# Patient Record
Sex: MALE
[De-identification: names, ages, dates, MRNs, and addresses within clinical notes are randomized per-mention and may not be internally consistent; named-entity substitution may affect disease eponyms.]

---

## 2018-05-29 ENCOUNTER — HOSPITAL ENCOUNTER (EMERGENCY)
Dept: HOSPITAL 5 - ED | Age: 18
Discharge: LEFT BEFORE DISCHARGE | End: 2018-05-29
Payer: SELF-PAY

## 2018-05-29 VITALS — SYSTOLIC BLOOD PRESSURE: 134 MMHG | DIASTOLIC BLOOD PRESSURE: 52 MMHG

## 2018-05-29 DIAGNOSIS — Z11.3: Primary | ICD-10-CM

## 2018-05-29 PROCEDURE — 99281 EMR DPT VST MAYX REQ PHY/QHP: CPT

## 2018-05-29 NOTE — EMERGENCY DEPARTMENT REPORT
Chief Complaint: Urogenital-Male


Stated Complaint: MEDICAL CLEARENCE/STD


Time Seen by Provider: 05/29/18 11:01





- HPI


History of Present Illness: 





This is a 17-year-old male nontoxic, well nourished in appearance, no acute 

signs of distress presents to the ED for a STD check up.  Patient states his 

girlfriend has been diagnosed with chlamydia and he wants to be tested.  

Patient currently denies any symptoms and stated he is asymptomatic.  Patient 

denies any penile discharge.  Patient denies any testicular pain, penile ulcers

, or lesions.  Patient denies any urinary symptoms.  Patient denies any fever, 

chills, nausea, vomiting, chest pain, short of breath, abdominal pain, back pain

, stiff neck or headache.  Patient denies any allergies or past medical history.





- Exam


Vital Signs: 


 Vital Signs











  05/29/18





  10:26


 


Temperature 97.4 F L


 


Pulse Rate 58 L


 


Respiratory 16





Rate 


 


Blood Pressure 134/52


 


O2 Sat by Pulse 100





Oximetry 











Physical Exam: 








GENERAL: The patient is a well-developed, well-nourished in no apparent 

distress. Patient is alert and acting appropriately for age.  Alert and 

oriented 3, no apparent distress, normal gait, atraumatic.





LUNGS: Clear to auscultation. Non labor breathing. No intercostal retractions.  

Symmetrical with respiration, no wheezing, no rales, or crackles.


 


HEART: Regular rate and rhythm without murmur, rubs or gallops. No 

reproducible.  S1, S2 present, regular rate and rhythm without murmur, no rubs, 

no gallops.


 


ABDOMEN: Soft, nontender, and nondistended.  Positive bowel sounds.  No 

hepatosplenomegaly was noted. No guarding or rebound tenderness, negative 

epigastric bruit. Negative psoas sign, negative jolly sign, negative McBurneys 

sign


 


MSE screening note: 


Focused history and physical exam performed.


Due to findings the following was ordered:











ED Medical Decision Making





- Medical Decision Making





This is a 17-year-old male that presents with nonmedical emergency.  Patient is 

stable and was examined by me.  Patient is asymptomatic and denies any 

symptoms.  Patient states he just wants to be tested for STD. Registration 

 has approached patient for a co-pay but patient refused.  I will refer 

the patient [facility]. At time of 

discharge, the patient does not seem toxic or ill in appearance.  No acute 

signs of distress noted.  Patient agrees to discharge treatment plan of care.  

No further questions noted by the patient.





ED Disposition for MSE


Clinical Impression: 


 Possible exposure to STD





Disposition: Z-07 MED SCREENING EXAM-LEFT


Is pt being admited?: No


Condition: Stable


Referrals: 


KAYLEE QUINTERO MD [Primary Care Provider] - 3-5 Days


[facility] [Outside] - 3-5 Days


[facility] [place][note 3[facility] [Outside] - 3-5 Days


ALPHONSE PAUL MD [Staff Physician] - 3-5 Days